# Patient Record
Sex: MALE | Race: WHITE | Employment: OTHER | URBAN - METROPOLITAN AREA
[De-identification: names, ages, dates, MRNs, and addresses within clinical notes are randomized per-mention and may not be internally consistent; named-entity substitution may affect disease eponyms.]

---

## 2017-01-01 ENCOUNTER — LAB REQUISITION (OUTPATIENT)
Dept: LAB | Facility: HOSPITAL | Age: 82
End: 2017-01-01
Payer: MEDICARE

## 2017-01-01 DIAGNOSIS — R41.0 DISORIENTATION: ICD-10-CM

## 2017-01-01 DIAGNOSIS — I48.91 ATRIAL FIBRILLATION (HCC): ICD-10-CM

## 2017-01-01 PROCEDURE — 87086 URINE CULTURE/COLONY COUNT: CPT | Performed by: INTERNAL MEDICINE

## 2017-01-01 PROCEDURE — 80048 BASIC METABOLIC PNL TOTAL CA: CPT | Performed by: INTERNAL MEDICINE

## 2017-01-01 PROCEDURE — 83880 ASSAY OF NATRIURETIC PEPTIDE: CPT | Performed by: INTERNAL MEDICINE

## 2017-01-01 PROCEDURE — 81001 URINALYSIS AUTO W/SCOPE: CPT | Performed by: INTERNAL MEDICINE

## 2017-01-01 PROCEDURE — 87186 SC STD MICRODIL/AGAR DIL: CPT | Performed by: INTERNAL MEDICINE

## 2017-01-01 PROCEDURE — 87077 CULTURE AEROBIC IDENTIFY: CPT | Performed by: INTERNAL MEDICINE

## 2017-02-01 PROCEDURE — 81003 URINALYSIS AUTO W/O SCOPE: CPT | Performed by: INTERNAL MEDICINE

## 2017-05-08 ENCOUNTER — LAB REQUISITION (OUTPATIENT)
Dept: LAB | Facility: HOSPITAL | Age: 82
End: 2017-05-08
Payer: MEDICARE

## 2017-05-08 DIAGNOSIS — E03.8 OTHER SPECIFIED HYPOTHYROIDISM: ICD-10-CM

## 2017-05-08 PROCEDURE — 83880 ASSAY OF NATRIURETIC PEPTIDE: CPT | Performed by: INTERNAL MEDICINE

## 2017-05-08 PROCEDURE — 85025 COMPLETE CBC W/AUTO DIFF WBC: CPT | Performed by: INTERNAL MEDICINE

## 2017-05-08 PROCEDURE — 80053 COMPREHEN METABOLIC PANEL: CPT | Performed by: INTERNAL MEDICINE

## 2017-05-08 PROCEDURE — 84443 ASSAY THYROID STIM HORMONE: CPT | Performed by: INTERNAL MEDICINE

## 2017-09-03 ENCOUNTER — LAB REQUISITION (OUTPATIENT)
Dept: LAB | Facility: HOSPITAL | Age: 82
End: 2017-09-03
Payer: MEDICARE

## 2017-09-03 DIAGNOSIS — R79.89 OTHER SPECIFIED ABNORMAL FINDINGS OF BLOOD CHEMISTRY: ICD-10-CM

## 2017-09-03 DIAGNOSIS — R68.89 OTHER GENERAL SYMPTOMS AND SIGNS: ICD-10-CM

## 2017-09-03 LAB
ANION GAP SERPL CALC-SCNC: 8 MMOL/L (ref 0–18)
BASOPHILS # BLD: 0.1 K/UL (ref 0–0.2)
BASOPHILS NFR BLD: 1 %
BUN SERPL-MCNC: 18 MG/DL (ref 8–20)
BUN/CREAT SERPL: 13 (ref 10–20)
CALCIUM SERPL-MCNC: 8.9 MG/DL (ref 8.5–10.5)
CHLORIDE SERPL-SCNC: 103 MMOL/L (ref 95–110)
CO2 SERPL-SCNC: 27 MMOL/L (ref 22–32)
CREAT SERPL-MCNC: 1.38 MG/DL (ref 0.5–1.5)
EOSINOPHIL # BLD: 0.4 K/UL (ref 0–0.7)
EOSINOPHIL NFR BLD: 5 %
ERYTHROCYTE [DISTWIDTH] IN BLOOD BY AUTOMATED COUNT: 14.9 % (ref 11–15)
GLUCOSE SERPL-MCNC: 99 MG/DL (ref 70–99)
HCT VFR BLD AUTO: 47.9 % (ref 41–52)
HGB BLD-MCNC: 15.7 G/DL (ref 13.5–17.5)
LYMPHOCYTES # BLD: 1.8 K/UL (ref 1–4)
LYMPHOCYTES NFR BLD: 21 %
MCH RBC QN AUTO: 29.9 PG (ref 27–32)
MCHC RBC AUTO-ENTMCNC: 32.7 G/DL (ref 32–37)
MCV RBC AUTO: 91.5 FL (ref 80–100)
MONOCYTES # BLD: 1.4 K/UL (ref 0–1)
MONOCYTES NFR BLD: 17 %
NEUTROPHILS # BLD AUTO: 4.8 K/UL (ref 1.8–7.7)
NEUTROPHILS NFR BLD: 56 %
OSMOLALITY UR CALC.SUM OF ELEC: 288 MOSM/KG (ref 275–295)
PLATELET # BLD AUTO: 185 K/UL (ref 140–400)
PMV BLD AUTO: 9.1 FL (ref 7.4–10.3)
POTASSIUM SERPL-SCNC: 3.6 MMOL/L (ref 3.3–5.1)
RBC # BLD AUTO: 5.24 M/UL (ref 4.5–5.9)
SODIUM SERPL-SCNC: 138 MMOL/L (ref 136–144)
WBC # BLD AUTO: 8.5 K/UL (ref 4–11)

## 2017-09-03 PROCEDURE — 80048 BASIC METABOLIC PNL TOTAL CA: CPT | Performed by: INTERNAL MEDICINE

## 2017-09-03 PROCEDURE — 85025 COMPLETE CBC W/AUTO DIFF WBC: CPT | Performed by: INTERNAL MEDICINE

## 2017-09-17 ENCOUNTER — LAB REQUISITION (OUTPATIENT)
Dept: LAB | Facility: HOSPITAL | Age: 82
End: 2017-09-17
Payer: MEDICARE

## 2017-09-17 DIAGNOSIS — R31.9 HEMATURIA: ICD-10-CM

## 2017-09-17 LAB
BILIRUB UR QL: NEGATIVE
CLARITY UR: CLEAR
COLOR UR: YELLOW
GLUCOSE UR-MCNC: NEGATIVE MG/DL
KETONES UR-MCNC: NEGATIVE MG/DL
NITRITE UR QL STRIP.AUTO: NEGATIVE
PH UR: 5 [PH] (ref 5–8)
PROT UR-MCNC: NEGATIVE MG/DL
RBC #/AREA URNS AUTO: 2 /HPF
SP GR UR STRIP: 1.01 (ref 1–1.03)
UROBILINOGEN UR STRIP-ACNC: 2
VIT C UR-MCNC: NEGATIVE MG/DL
WBC #/AREA URNS AUTO: 2 /HPF

## 2017-09-17 PROCEDURE — 87086 URINE CULTURE/COLONY COUNT: CPT | Performed by: INTERNAL MEDICINE

## 2017-09-17 PROCEDURE — 81001 URINALYSIS AUTO W/SCOPE: CPT | Performed by: INTERNAL MEDICINE

## 2017-09-19 ENCOUNTER — LAB REQUISITION (OUTPATIENT)
Dept: LAB | Facility: HOSPITAL | Age: 82
End: 2017-09-19
Payer: MEDICARE

## 2017-09-19 DIAGNOSIS — R41.0 DISORIENTATION: ICD-10-CM

## 2017-09-19 LAB
BACTERIA UR QL AUTO: NEGATIVE /HPF
BILIRUB UR QL: NEGATIVE
CLARITY UR: CLEAR
COLOR UR: YELLOW
GLUCOSE UR-MCNC: NEGATIVE MG/DL
KETONES UR-MCNC: NEGATIVE MG/DL
LEUKOCYTE ESTERASE UR QL STRIP.AUTO: NEGATIVE
NITRITE UR QL STRIP.AUTO: NEGATIVE
PH UR: 5 [PH] (ref 5–8)
PROT UR-MCNC: NEGATIVE MG/DL
RBC #/AREA URNS AUTO: 1 /HPF
SP GR UR STRIP: 1.01 (ref 1–1.03)
UROBILINOGEN UR STRIP-ACNC: <2
VIT C UR-MCNC: NEGATIVE MG/DL
WBC #/AREA URNS AUTO: 0 /HPF

## 2017-09-19 PROCEDURE — 81001 URINALYSIS AUTO W/SCOPE: CPT

## 2017-09-19 PROCEDURE — 87077 CULTURE AEROBIC IDENTIFY: CPT

## 2017-09-19 PROCEDURE — 87086 URINE CULTURE/COLONY COUNT: CPT

## 2017-09-19 PROCEDURE — 87186 SC STD MICRODIL/AGAR DIL: CPT

## 2018-01-01 ENCOUNTER — APPOINTMENT (OUTPATIENT)
Dept: CT IMAGING | Facility: HOSPITAL | Age: 83
End: 2018-01-01
Attending: EMERGENCY MEDICINE
Payer: MEDICARE

## 2018-01-01 ENCOUNTER — APPOINTMENT (OUTPATIENT)
Dept: CT IMAGING | Facility: HOSPITAL | Age: 83
DRG: 872 | End: 2018-01-01
Attending: NURSE PRACTITIONER
Payer: MEDICARE

## 2018-01-01 ENCOUNTER — APPOINTMENT (OUTPATIENT)
Dept: GENERAL RADIOLOGY | Facility: HOSPITAL | Age: 83
End: 2018-01-01
Attending: EMERGENCY MEDICINE
Payer: MEDICARE

## 2018-01-01 ENCOUNTER — APPOINTMENT (OUTPATIENT)
Dept: GENERAL RADIOLOGY | Facility: HOSPITAL | Age: 83
DRG: 872 | End: 2018-01-01
Attending: EMERGENCY MEDICINE
Payer: MEDICARE

## 2018-01-01 ENCOUNTER — APPOINTMENT (OUTPATIENT)
Dept: CT IMAGING | Facility: HOSPITAL | Age: 83
DRG: 872 | End: 2018-01-01
Attending: HOSPITALIST
Payer: MEDICARE

## 2018-01-01 ENCOUNTER — LAB REQUISITION (OUTPATIENT)
Dept: LAB | Facility: HOSPITAL | Age: 83
End: 2018-01-01
Payer: MEDICARE

## 2018-01-01 ENCOUNTER — HOSPITAL ENCOUNTER (EMERGENCY)
Facility: HOSPITAL | Age: 83
Discharge: HOME | End: 2018-01-01
Attending: EMERGENCY MEDICINE
Payer: MEDICARE

## 2018-01-01 ENCOUNTER — HOSPITAL ENCOUNTER (INPATIENT)
Facility: HOSPITAL | Age: 83
LOS: 3 days | Discharge: NURSING, REHAB OR LONG-TERM CARE | DRG: 872 | End: 2018-01-01
Attending: EMERGENCY MEDICINE | Admitting: HOSPITALIST
Payer: MEDICARE

## 2018-01-01 VITALS
TEMPERATURE: 98 F | WEIGHT: 185 LBS | RESPIRATION RATE: 18 BRPM | OXYGEN SATURATION: 93 % | DIASTOLIC BLOOD PRESSURE: 59 MMHG | HEART RATE: 77 BPM | HEIGHT: 71 IN | BODY MASS INDEX: 25.9 KG/M2 | SYSTOLIC BLOOD PRESSURE: 124 MMHG

## 2018-01-01 VITALS
SYSTOLIC BLOOD PRESSURE: 133 MMHG | HEIGHT: 72 IN | BODY MASS INDEX: 25.06 KG/M2 | WEIGHT: 185 LBS | HEART RATE: 82 BPM | RESPIRATION RATE: 18 BRPM | DIASTOLIC BLOOD PRESSURE: 72 MMHG | TEMPERATURE: 97 F | OXYGEN SATURATION: 92 %

## 2018-01-01 DIAGNOSIS — S61.412A LACERATION OF LEFT HAND WITHOUT FOREIGN BODY, INITIAL ENCOUNTER: ICD-10-CM

## 2018-01-01 DIAGNOSIS — E03.9 HYPOTHYROIDISM: ICD-10-CM

## 2018-01-01 DIAGNOSIS — N18.9 CHRONIC KIDNEY DISEASE: ICD-10-CM

## 2018-01-01 DIAGNOSIS — M19.91 PRIMARY OSTEOARTHRITIS: ICD-10-CM

## 2018-01-01 DIAGNOSIS — R79.89 OTHER SPECIFIED ABNORMAL FINDINGS OF BLOOD CHEMISTRY: ICD-10-CM

## 2018-01-01 DIAGNOSIS — N18.30 CHRONIC KIDNEY DISEASE, STAGE III (MODERATE) (HCC): ICD-10-CM

## 2018-01-01 DIAGNOSIS — R11.2 NAUSEA AND VOMITING IN ADULT: Primary | ICD-10-CM

## 2018-01-01 DIAGNOSIS — R60.9 EDEMA: ICD-10-CM
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
## 2018-01-01 DIAGNOSIS — S09.90XA CLOSED HEAD INJURY, INITIAL ENCOUNTER: Primary | ICD-10-CM

## 2018-01-01 DIAGNOSIS — R05.9 COUGH: ICD-10-CM

## 2018-01-01 DIAGNOSIS — Z91.81 HISTORY OF FALLING: ICD-10-CM

## 2018-01-01 DIAGNOSIS — J69.0 ASPIRATION PNEUMONITIS (HCC): ICD-10-CM

## 2018-01-01 DIAGNOSIS — N30.00 ACUTE CYSTITIS WITHOUT HEMATURIA: ICD-10-CM

## 2018-01-01 DIAGNOSIS — I10 ESSENTIAL (PRIMARY) HYPERTENSION: ICD-10-CM

## 2018-01-01 DIAGNOSIS — E78.5 HYPERLIPIDEMIA: ICD-10-CM

## 2018-01-01 DIAGNOSIS — R60.0 LOCALIZED EDEMA: ICD-10-CM

## 2018-01-01 DIAGNOSIS — R09.02 HYPOXIA: ICD-10-CM

## 2018-01-01 DIAGNOSIS — M62.81 MUSCLE WEAKNESS (GENERALIZED): ICD-10-CM

## 2018-01-01 DIAGNOSIS — T14.8XXA MULTIPLE SKIN TEARS: ICD-10-CM

## 2018-01-01 LAB
ADENOVIRUS PCR:: NEGATIVE
ALBUMIN SERPL BCP-MCNC: 3.5 G/DL (ref 3.5–4.8)
ALBUMIN/GLOB SERPL: 1.3 {RATIO} (ref 1–2)
ALP SERPL-CCNC: 62 U/L (ref 32–100)
ALT SERPL-CCNC: 13 U/L (ref 17–63)
ANION GAP SERPL CALC-SCNC: 7 MMOL/L (ref 0–18)
ANION GAP SERPL CALC-SCNC: 7 MMOL/L (ref 0–18)
AST SERPL-CCNC: 20 U/L (ref 15–41)
B PERT DNA SPEC QL NAA+PROBE: NEGATIVE
BACTERIA UR QL AUTO: NEGATIVE /HPF
BASOPHILS # BLD: 0.1 K/UL (ref 0–0.2)
BASOPHILS NFR BLD: 1 %
BILIRUB SERPL-MCNC: 1.2 MG/DL (ref 0.3–1.2)
BILIRUB UR QL: NEGATIVE
BUN SERPL-MCNC: 16 MG/DL (ref 8–20)
BUN SERPL-MCNC: 20 MG/DL (ref 8–20)
BUN/CREAT SERPL: 12.9 (ref 10–20)
BUN/CREAT SERPL: 14.9 (ref 10–20)
C PNEUM DNA SPEC QL NAA+PROBE: NEGATIVE
CALCIUM SERPL-MCNC: 8.8 MG/DL (ref 8.5–10.5)
CALCIUM SERPL-MCNC: 8.9 MG/DL (ref 8.5–10.5)
CHLORIDE SERPL-SCNC: 101 MMOL/L (ref 95–110)
CHLORIDE SERPL-SCNC: 104 MMOL/L (ref 95–110)
CLARITY UR: CLEAR
CO2 SERPL-SCNC: 30 MMOL/L (ref 22–32)
CO2 SERPL-SCNC: 32 MMOL/L (ref 22–32)
COLOR UR: YELLOW
CORONAVIRUS 229E PCR:: NEGATIVE
CORONAVIRUS HKU1 PCR:: NEGATIVE
CORONAVIRUS NL63 PCR:: NEGATIVE
CORONAVIRUS OC43 PCR:: NEGATIVE
CREAT SERPL-MCNC: 1.24 MG/DL (ref 0.5–1.5)
CREAT SERPL-MCNC: 1.34 MG/DL (ref 0.5–1.5)
EOSINOPHIL # BLD: 0.5 K/UL (ref 0–0.7)
EOSINOPHIL NFR BLD: 6 %
ERYTHROCYTE [DISTWIDTH] IN BLOOD BY AUTOMATED COUNT: 16.2 % (ref 11–15)
FLUAV RNA SPEC QL NAA+PROBE: NEGATIVE
FLUBV RNA SPEC QL NAA+PROBE: NEGATIVE
GLOBULIN PLAS-MCNC: 2.6 G/DL (ref 2.5–3.7)
GLUCOSE SERPL-MCNC: 85 MG/DL (ref 70–99)
GLUCOSE SERPL-MCNC: 93 MG/DL (ref 70–99)
GLUCOSE UR-MCNC: NEGATIVE MG/DL
HCT VFR BLD AUTO: 42.8 % (ref 41–52)
HGB BLD-MCNC: 14.4 G/DL (ref 13.5–17.5)
HGB UR QL STRIP.AUTO: NEGATIVE
HYALINE CASTS #/AREA URNS AUTO: 1 /LPF
KETONES UR-MCNC: NEGATIVE MG/DL
LEUKOCYTE ESTERASE UR QL STRIP.AUTO: NEGATIVE
LYMPHOCYTES # BLD: 1.4 K/UL (ref 1–4)
LYMPHOCYTES NFR BLD: 17 %
MCH RBC QN AUTO: 29.4 PG (ref 27–32)
MCHC RBC AUTO-ENTMCNC: 33.5 G/DL (ref 32–37)
MCV RBC AUTO: 87.8 FL (ref 80–100)
METAPNEUMOVIRUS PCR:: NEGATIVE
MONOCYTES # BLD: 1.2 K/UL (ref 0–1)
MONOCYTES NFR BLD: 15 %
MYCOPLASMA PNEUMONIA PCR:: NEGATIVE
NEUTROPHILS # BLD AUTO: 5.1 K/UL (ref 1.8–7.7)
NEUTROPHILS NFR BLD: 61 %
NITRITE UR QL STRIP.AUTO: NEGATIVE
OSMOLALITY UR CALC.SUM OF ELEC: 286 MOSM/KG (ref 275–295)
OSMOLALITY UR CALC.SUM OF ELEC: 298 MOSM/KG (ref 275–295)
PARAINFLUENZA 1 PCR:: NEGATIVE
PARAINFLUENZA 2 PCR:: NEGATIVE
PARAINFLUENZA 3 PCR:: NEGATIVE
PARAINFLUENZA 4 PCR:: NEGATIVE
PH UR: 5 [PH] (ref 5–8)
PLATELET # BLD AUTO: 146 K/UL (ref 140–400)
PMV BLD AUTO: 9.7 FL (ref 7.4–10.3)
POTASSIUM SERPL-SCNC: 3.1 MMOL/L (ref 3.3–5.1)
POTASSIUM SERPL-SCNC: 3.5 MMOL/L (ref 3.3–5.1)
PROT SERPL-MCNC: 6.1 G/DL (ref 5.9–8.4)
PROT UR-MCNC: NEGATIVE MG/DL
RBC # BLD AUTO: 4.88 M/UL (ref 4.5–5.9)
RBC #/AREA URNS AUTO: 1 /HPF
RHINOVIRUS/ENTERO PCR:: NEGATIVE
RSV RNA SPEC QL NAA+PROBE: NEGATIVE
SODIUM SERPL-SCNC: 138 MMOL/L (ref 136–144)
SODIUM SERPL-SCNC: 143 MMOL/L (ref 136–144)
SP GR UR STRIP: 1.01 (ref 1–1.03)
TSH SERPL-ACNC: 4.26 UIU/ML (ref 0.45–5.33)
UROBILINOGEN UR STRIP-ACNC: <2
VIT C UR-MCNC: 40 MG/DL
WBC # BLD AUTO: 8.3 K/UL (ref 4–11)
WBC #/AREA URNS AUTO: 1 /HPF

## 2018-01-01 PROCEDURE — 85025 COMPLETE CBC W/AUTO DIFF WBC: CPT | Performed by: EMERGENCY MEDICINE

## 2018-01-01 PROCEDURE — 85025 COMPLETE CBC W/AUTO DIFF WBC: CPT | Performed by: INTERNAL MEDICINE

## 2018-01-01 PROCEDURE — 36415 COLL VENOUS BLD VENIPUNCTURE: CPT

## 2018-01-01 PROCEDURE — A4216 STERILE WATER/SALINE, 10 ML: HCPCS | Performed by: HOSPITALIST

## 2018-01-01 PROCEDURE — 93010 ELECTROCARDIOGRAM REPORT: CPT | Performed by: EMERGENCY MEDICINE

## 2018-01-01 PROCEDURE — 96365 THER/PROPH/DIAG IV INF INIT: CPT

## 2018-01-01 PROCEDURE — 84132 ASSAY OF SERUM POTASSIUM: CPT | Performed by: HOSPITALIST

## 2018-01-01 PROCEDURE — 71250 CT THORAX DX C-: CPT | Performed by: NURSE PRACTITIONER

## 2018-01-01 PROCEDURE — 87086 URINE CULTURE/COLONY COUNT: CPT

## 2018-01-01 PROCEDURE — 83880 ASSAY OF NATRIURETIC PEPTIDE: CPT | Performed by: EMERGENCY MEDICINE

## 2018-01-01 PROCEDURE — 93005 ELECTROCARDIOGRAM TRACING: CPT

## 2018-01-01 PROCEDURE — 87077 CULTURE AEROBIC IDENTIFY: CPT | Performed by: EMERGENCY MEDICINE

## 2018-01-01 PROCEDURE — 73560 X-RAY EXAM OF KNEE 1 OR 2: CPT | Performed by: EMERGENCY MEDICINE

## 2018-01-01 PROCEDURE — 82040 ASSAY OF SERUM ALBUMIN: CPT | Performed by: FAMILY MEDICINE

## 2018-01-01 PROCEDURE — 97161 PT EVAL LOW COMPLEX 20 MIN: CPT

## 2018-01-01 PROCEDURE — 85025 COMPLETE CBC W/AUTO DIFF WBC: CPT | Performed by: HOSPITALIST

## 2018-01-01 PROCEDURE — 87486 CHLMYD PNEUM DNA AMP PROBE: CPT

## 2018-01-01 PROCEDURE — 83690 ASSAY OF LIPASE: CPT | Performed by: EMERGENCY MEDICINE

## 2018-01-01 PROCEDURE — 99285 EMERGENCY DEPT VISIT HI MDM: CPT

## 2018-01-01 PROCEDURE — 73130 X-RAY EXAM OF HAND: CPT | Performed by: EMERGENCY MEDICINE

## 2018-01-01 PROCEDURE — 96376 TX/PRO/DX INJ SAME DRUG ADON: CPT

## 2018-01-01 PROCEDURE — 81003 URINALYSIS AUTO W/O SCOPE: CPT

## 2018-01-01 PROCEDURE — 83735 ASSAY OF MAGNESIUM: CPT | Performed by: HOSPITALIST

## 2018-01-01 PROCEDURE — 92610 EVALUATE SWALLOWING FUNCTION: CPT

## 2018-01-01 PROCEDURE — 81001 URINALYSIS AUTO W/SCOPE: CPT | Performed by: EMERGENCY MEDICINE

## 2018-01-01 PROCEDURE — 96375 TX/PRO/DX INJ NEW DRUG ADDON: CPT

## 2018-01-01 PROCEDURE — 80076 HEPATIC FUNCTION PANEL: CPT | Performed by: EMERGENCY MEDICINE

## 2018-01-01 PROCEDURE — 80076 HEPATIC FUNCTION PANEL: CPT | Performed by: NURSE PRACTITIONER

## 2018-01-01 PROCEDURE — 87186 SC STD MICRODIL/AGAR DIL: CPT | Performed by: EMERGENCY MEDICINE

## 2018-01-01 PROCEDURE — 83605 ASSAY OF LACTIC ACID: CPT | Performed by: NURSE PRACTITIONER

## 2018-01-01 PROCEDURE — 80048 BASIC METABOLIC PNL TOTAL CA: CPT | Performed by: NURSE PRACTITIONER

## 2018-01-01 PROCEDURE — 80048 BASIC METABOLIC PNL TOTAL CA: CPT | Performed by: INTERNAL MEDICINE

## 2018-01-01 PROCEDURE — 12001 RPR S/N/AX/GEN/TRNK 2.5CM/<: CPT

## 2018-01-01 PROCEDURE — 80048 BASIC METABOLIC PNL TOTAL CA: CPT | Performed by: EMERGENCY MEDICINE

## 2018-01-01 PROCEDURE — 80053 COMPREHEN METABOLIC PANEL: CPT | Performed by: INTERNAL MEDICINE

## 2018-01-01 PROCEDURE — C9113 INJ PANTOPRAZOLE SODIUM, VIA: HCPCS | Performed by: EMERGENCY MEDICINE

## 2018-01-01 PROCEDURE — 87040 BLOOD CULTURE FOR BACTERIA: CPT | Performed by: EMERGENCY MEDICINE

## 2018-01-01 PROCEDURE — 87633 RESP VIRUS 12-25 TARGETS: CPT

## 2018-01-01 PROCEDURE — 74176 CT ABD & PELVIS W/O CONTRAST: CPT | Performed by: NURSE PRACTITIONER

## 2018-01-01 PROCEDURE — 87581 M.PNEUMON DNA AMP PROBE: CPT

## 2018-01-01 PROCEDURE — 85025 COMPLETE CBC W/AUTO DIFF WBC: CPT | Performed by: NURSE PRACTITIONER

## 2018-01-01 PROCEDURE — 87086 URINE CULTURE/COLONY COUNT: CPT | Performed by: EMERGENCY MEDICINE

## 2018-01-01 PROCEDURE — 71045 X-RAY EXAM CHEST 1 VIEW: CPT | Performed by: EMERGENCY MEDICINE

## 2018-01-01 PROCEDURE — 87798 DETECT AGENT NOS DNA AMP: CPT

## 2018-01-01 PROCEDURE — 70450 CT HEAD/BRAIN W/O DYE: CPT | Performed by: EMERGENCY MEDICINE

## 2018-01-01 PROCEDURE — 84443 ASSAY THYROID STIM HORMONE: CPT | Performed by: INTERNAL MEDICINE

## 2018-01-01 PROCEDURE — 84484 ASSAY OF TROPONIN QUANT: CPT | Performed by: EMERGENCY MEDICINE

## 2018-01-01 PROCEDURE — 97535 SELF CARE MNGMENT TRAINING: CPT

## 2018-01-01 PROCEDURE — A4216 STERILE WATER/SALINE, 10 ML: HCPCS | Performed by: EMERGENCY MEDICINE

## 2018-01-01 PROCEDURE — 80053 COMPREHEN METABOLIC PANEL: CPT | Performed by: HOSPITALIST

## 2018-01-01 PROCEDURE — 97530 THERAPEUTIC ACTIVITIES: CPT

## 2018-01-01 PROCEDURE — 72125 CT NECK SPINE W/O DYE: CPT | Performed by: EMERGENCY MEDICINE

## 2018-01-01 PROCEDURE — 84145 PROCALCITONIN (PCT): CPT | Performed by: NURSE PRACTITIONER

## 2018-01-01 PROCEDURE — 97166 OT EVAL MOD COMPLEX 45 MIN: CPT

## 2018-01-01 RX ORDER — POLYETHYLENE GLYCOL 3350 17 G/17G
17 POWDER, FOR SOLUTION ORAL DAILY PRN
Status: DISCONTINUED | OUTPATIENT
Start: 2018-01-01 | End: 2018-01-01

## 2018-01-01 RX ORDER — SODIUM CHLORIDE 9 MG/ML
INJECTION, SOLUTION INTRAVENOUS CONTINUOUS
Status: DISCONTINUED | OUTPATIENT
Start: 2018-01-01 | End: 2018-01-01

## 2018-01-01 RX ORDER — IBUPROFEN 200 MG
CAPSULE ORAL DAILY
COMMUNITY

## 2018-01-01 RX ORDER — ONDANSETRON 4 MG/1
4 TABLET, ORALLY DISINTEGRATING ORAL EVERY 8 HOURS PRN
Qty: 20 TABLET | Refills: 0 | Status: SHIPPED | OUTPATIENT
Start: 2018-01-01

## 2018-01-01 RX ORDER — CEPHALEXIN 500 MG/1
500 CAPSULE ORAL ONCE
Status: COMPLETED | OUTPATIENT
Start: 2018-01-01 | End: 2018-01-01

## 2018-01-01 RX ORDER — POTASSIUM CHLORIDE 20 MEQ/1
40 TABLET, EXTENDED RELEASE ORAL ONCE
Status: COMPLETED | OUTPATIENT
Start: 2018-01-01 | End: 2018-01-01

## 2018-01-01 RX ORDER — ASPIRIN 325 MG
325 TABLET ORAL DAILY
Status: DISCONTINUED | OUTPATIENT
Start: 2018-01-01 | End: 2018-01-01

## 2018-01-01 RX ORDER — SERTRALINE HYDROCHLORIDE 100 MG/1
100 TABLET, FILM COATED ORAL
Status: DISCONTINUED | OUTPATIENT
Start: 2018-01-01 | End: 2018-01-01

## 2018-01-01 RX ORDER — HEPARIN SODIUM 5000 [USP'U]/ML
5000 INJECTION, SOLUTION INTRAVENOUS; SUBCUTANEOUS EVERY 8 HOURS SCHEDULED
Status: DISCONTINUED | OUTPATIENT
Start: 2018-01-01 | End: 2018-01-01

## 2018-01-01 RX ORDER — ATORVASTATIN CALCIUM 20 MG/1
20 TABLET, FILM COATED ORAL
Status: DISCONTINUED | OUTPATIENT
Start: 2018-01-01 | End: 2018-01-01

## 2018-01-01 RX ORDER — SACCHAROMYCES BOULARDII 250 MG
250 CAPSULE ORAL 2 TIMES DAILY
Status: DISCONTINUED | OUTPATIENT
Start: 2018-01-01 | End: 2018-01-01

## 2018-01-01 RX ORDER — MINERAL OIL, PETROLATUM 425; 568 MG/G; MG/G
OINTMENT OPHTHALMIC DAILY
COMMUNITY

## 2018-01-01 RX ORDER — BISACODYL 10 MG
10 SUPPOSITORY, RECTAL RECTAL
Status: DISCONTINUED | OUTPATIENT
Start: 2018-01-01 | End: 2018-01-01

## 2018-01-01 RX ORDER — CEFDINIR 300 MG/1
300 CAPSULE ORAL 2 TIMES DAILY
Qty: 14 CAPSULE | Refills: 0 | Status: SHIPPED | OUTPATIENT
Start: 2018-01-01 | End: 2018-01-01

## 2018-01-01 RX ORDER — MAGNESIUM OXIDE 400 MG (241.3 MG MAGNESIUM) TABLET
2 TABLET NIGHTLY PRN
Status: DISCONTINUED | OUTPATIENT
Start: 2018-01-01 | End: 2018-01-01

## 2018-01-01 RX ORDER — SODIUM CHLORIDE 0.9 % (FLUSH) 0.9 %
3 SYRINGE (ML) INJECTION AS NEEDED
Status: DISCONTINUED | OUTPATIENT
Start: 2018-01-01 | End: 2018-01-01

## 2018-01-01 RX ORDER — LEVOTHYROXINE SODIUM 0.03 MG/1
25 TABLET ORAL
Status: DISCONTINUED | OUTPATIENT
Start: 2018-01-01 | End: 2018-01-01

## 2018-01-01 RX ORDER — METOCLOPRAMIDE HYDROCHLORIDE 5 MG/ML
10 INJECTION INTRAMUSCULAR; INTRAVENOUS EVERY 8 HOURS PRN
Status: DISCONTINUED | OUTPATIENT
Start: 2018-01-01 | End: 2018-01-01

## 2018-01-01 RX ORDER — POTASSIUM CHLORIDE 20 MEQ/1
40 TABLET, EXTENDED RELEASE ORAL EVERY 4 HOURS
Status: DISCONTINUED | OUTPATIENT
Start: 2018-01-01 | End: 2018-01-01

## 2018-01-01 RX ORDER — ONDANSETRON 2 MG/ML
4 INJECTION INTRAMUSCULAR; INTRAVENOUS EVERY 6 HOURS PRN
Status: DISCONTINUED | OUTPATIENT
Start: 2018-01-01 | End: 2018-01-01

## 2018-01-01 RX ORDER — LORAZEPAM 1 MG/1
TABLET ORAL
Qty: 10 TABLET | Refills: 0 | Status: SHIPPED | OUTPATIENT
Start: 2018-01-01

## 2018-01-01 RX ORDER — POLYVINYL ALCOHOL 14 MG/ML
1 SOLUTION/ DROPS OPHTHALMIC DAILY
Status: DISCONTINUED | OUTPATIENT
Start: 2018-01-01 | End: 2018-01-01

## 2018-01-01 RX ORDER — TRAMADOL HYDROCHLORIDE 50 MG/1
50 TABLET ORAL DAILY
Status: ON HOLD | COMMUNITY
End: 2018-01-01

## 2018-01-01 RX ORDER — SACCHAROMYCES BOULARDII 250 MG
250 CAPSULE ORAL 2 TIMES DAILY
Qty: 20 CAPSULE | Refills: 0 | Status: SHIPPED | OUTPATIENT
Start: 2018-01-01 | End: 2018-01-01

## 2018-01-01 RX ORDER — DOCUSATE SODIUM 100 MG/1
100 CAPSULE, LIQUID FILLED ORAL 2 TIMES DAILY
Status: DISCONTINUED | OUTPATIENT
Start: 2018-01-01 | End: 2018-01-01

## 2018-01-01 RX ORDER — DEXTROSE AND SODIUM CHLORIDE 5; .45 G/100ML; G/100ML
INJECTION, SOLUTION INTRAVENOUS CONTINUOUS
Status: DISCONTINUED | OUTPATIENT
Start: 2018-01-01 | End: 2018-01-01

## 2018-01-01 RX ORDER — ONDANSETRON 2 MG/ML
4 INJECTION INTRAMUSCULAR; INTRAVENOUS EVERY 4 HOURS PRN
Status: DISCONTINUED | OUTPATIENT
Start: 2018-01-01 | End: 2018-01-01

## 2018-01-01 RX ORDER — ONDANSETRON 2 MG/ML
4 INJECTION INTRAMUSCULAR; INTRAVENOUS ONCE
Status: COMPLETED | OUTPATIENT
Start: 2018-01-01 | End: 2018-01-01

## 2018-01-01 RX ORDER — CEPHALEXIN 500 MG/1
500 CAPSULE ORAL 3 TIMES DAILY
Qty: 21 CAPSULE | Refills: 0 | Status: SHIPPED | OUTPATIENT
Start: 2018-01-01 | End: 2018-01-01

## 2018-01-01 RX ORDER — SODIUM PHOSPHATE, DIBASIC AND SODIUM PHOSPHATE, MONOBASIC 7; 19 G/133ML; G/133ML
1 ENEMA RECTAL ONCE AS NEEDED
Status: DISCONTINUED | OUTPATIENT
Start: 2018-01-01 | End: 2018-01-01

## 2018-01-01 RX ORDER — CEFUROXIME AXETIL 500 MG/1
500 TABLET ORAL 2 TIMES DAILY
Qty: 8 TABLET | Refills: 0 | Status: SHIPPED | OUTPATIENT
Start: 2018-01-01 | End: 2018-01-01

## 2018-01-01 RX ORDER — ACETAMINOPHEN 325 MG/1
650 TABLET ORAL EVERY 6 HOURS PRN
Status: DISCONTINUED | OUTPATIENT
Start: 2018-01-01 | End: 2018-01-01

## 2018-01-01 RX ORDER — POLYETHYLENE GLYCOL 3350 17 G/17G
17 POWDER, FOR SOLUTION ORAL DAILY
Status: DISCONTINUED | OUTPATIENT
Start: 2018-01-01 | End: 2018-01-01

## 2018-05-31 PROBLEM — R11.2 NAUSEA AND VOMITING IN ADULT: Status: ACTIVE | Noted: 2018-01-01

## 2018-06-01 PROBLEM — J69.0 ASPIRATION PNEUMONITIS (HCC): Status: ACTIVE | Noted: 2018-01-01

## 2018-06-01 PROBLEM — R09.02 HYPOXIA: Status: ACTIVE | Noted: 2018-01-01

## 2018-06-01 NOTE — CM/SW NOTE
06/01/18 1400   CM/SW Screening   Referral Source Nurse;   Information Source Chart review;[facility] staff;Nursing rounds   Patient's Mental Status Confused   [facility]   Patient lives with Alone   Pa

## 2018-06-01 NOTE — ED NOTES
Spoke with 5th floor charge who states she does not have a room for the patient.  Demetrius Quintero made aware

## 2018-06-01 NOTE — CM/SW NOTE
Rosa confirmed pt is from [facility] assisted living memory unit. Dtr/Halle stated she is hopefully pt will be able to return back, but is agreeable for rehab at [facility], if recommended. ROSA contacted [facility] for DON screen.     Urmila Share, [address]

## 2018-06-01 NOTE — ED INITIAL ASSESSMENT (HPI)
Pt arrived per EMS from [facility]. EMS states per facility pt started vomiting after dinner and has not been feeling well for a couple of days. Pt is on  Alzheimer unit and is a/o x 1 at baseline.

## 2018-06-01 NOTE — H&P
[facility] Hospitalist Team  History and Physical     ASSESSMENT / PLAN:   79 yo male who resides alzheimers dementia, chronic afib- no AC due to falls, CKD stage 3, HTN, OAB, anxiety/depression, hypothyroidism,HL, bladder CA, skin cancer who presents with N/V.  P Abdominal pain, N/V, diarrhea, dysuria, fever, pain. Hx obtained via daughter Phillip Chaudhry with pt dementia.  Per Phillip Chaudhry pt had dinner around 4 pm yesterday and some time after that started to profusely vomit and haves shaking which prompted ER eval. While in t arthritis 7/8/2013   • HYPERLIPIDEMIA    • HYPERTENSION    • IMPOTENCE    • KIDNEY STONE    • Memory loss 2/15/2013   • OSTEOARTHRITIS     Knees   • OTHER DISEASES     Coronary vasospasm   • OTHER DISEASES     History of hepatitis B- No residual disease 20 MG Oral Tab Take 1 tablet (20 mg total) by mouth once daily. Disp: 90 tablet Rfl: 3   Polyethylene Glycol 3350 (MIRALAX) Oral Powder Take 17 g by mouth daily.  Disp: 850 g Rfl: 6   acetaminophen (TYLENOL EXTRA STRENGTH) 500 MG Oral Tab Take 2 tablets by headaches or vision changes, denies abd pain, no other complaints    objective  /55 (BP Location: Left arm)   Pulse 69   Temp 98.9 °F (37.2 °C) (Oral)   Resp 18   Ht 5' 11\" (1.803 m)   Wt 185 lb (83.9 kg)   SpO2 96%   BMI 25.80 kg/m²      Gen: No ac

## 2018-06-01 NOTE — OCCUPATIONAL THERAPY NOTE
OCCUPATIONAL THERAPY EVALUATION - INPATIENT     Room Number: 448/731-F  Evaluation Date: 6/1/2018  Type of Evaluation: Initial  Presenting Problem: nausea/vomiting    Physician Order: IP Consult to Occupational Therapy  Reason for Therapy: ADL/IADL Dysfunc status. At this time, not sure what patient's baseline functioning is (BADLs, transfers, mobility); will follow up w/ patient's daughter Phillip Chaudhry for more information on PLOF to better determine patient's acute IP goals and plan for discharge. RN aware.   In t disease   • OTHER DISEASES     Right inguinal hernia   • OTHER DISEASES     Essential tremor   • OTHER DISEASES     Cartilage injury to right knee- Pain manageable   • Venous insufficiency 9/25/2013       Past Surgical History  Past Surgical History:  No d and visual cues for therapy tasks    RANGE OF MOTION   Upper extremity ROM is within functional limits     STRENGTH ASSESSMENT  Upper extremity strength is within functional limits     COORDINATION  Gross Motor: WFL   Fine Motor: WFL     ADDITIONAL TESTS complete functional transfer w/ mod A. (goal pending depending on increased information of patient's PLOF).    Comment:     Comment:          Goals  on: 2018  Frequency: 3x/week    ALBINO Cannon/L  [facility],THE

## 2018-06-01 NOTE — PLAN OF CARE
CARDIOVASCULAR - ADULT    • Maintains optimal cardiac output and hemodynamic stability Progressing        GASTROINTESTINAL - ADULT    • Minimal or absence of nausea and vomiting Progressing        Impaired Activities of Daily Living    • Achieve highest/sa

## 2018-06-01 NOTE — ED PROVIDER NOTES
Patient Seen in: [facility] AND [facility] Emergency Department    History   Patient presents with:  Nausea/Vomiting/Diarrhea (gastrointestinal)    Stated Complaint:  Nausea/vomiting    HPI    81 yo M with PMH Alzheimer dementia (baseline orientation/neuro status Glycol (SYSTANE BALANCE) 0.6 % Ophthalmic Solution,  Apply to eye. Diclofenac Sodium (VOLTAREN) 1 % Transdermal Gel,  Apply 2 g to each knee three times daily as needed for pain   furosemide 20 MG Oral Tab,  Take 1 tablet (20 mg total) by mouth daily. in HPI.     Physical Exam   ED Triage Vitals [05/31/18 2016]  BP: 145/75  Pulse: 88  Resp: 24  Temp: 99.8 °F (37.7 °C)  Temp src: Oral  SpO2: 95 %  O2 Device: None (Room air)    Current:/75   Pulse 88   Temp 99.8 °F (37.7 °C) (Oral)   Resp 24   Ht 180 Status                     ---------                               -----------         ------                     CBC W/ DIFFERENTIAL[[id number]]          Abnormal            Final result                 Please view results for these tests on the individual without respiratory distress though labs notable for leukocytosis without bandemia in AF/HDS patient. Given aforementioned, will admit for ongoing management - NH paperwork reviewed and with DNR/comfort care measure noted.  PCP Dr. Winnie Donato, graciously admitte

## 2018-06-02 NOTE — PROGRESS NOTES
[facility] Hospitalist Progress Note     CC: Hospital Follow up    PCP: Juancho Lombardi MD       Assessment/Plan:     Principal Problem:    Nausea and vomiting in adult  Active Problems:    Aspiration pneumonitis [facility])    Hypoxia    81 yo male who resides Hospitalist     Subjective:     Feeling well, denies complaints    OBJECTIVE:    Blood pressure 131/68, pulse 85, temperature 99.4 °F (37.4 °C), temperature source Oral, resp. rate 18, height 5' 11\" (1.803 m), weight 185 lb (83.9 kg), SpO2 93 %.     Temp: Date: 6/1/2018  CONCLUSION:  1. No evidence for pneumonia. Subsegmental atelectasis in both lower lobes. 2. Nonspecific bilateral perinephric stranding. Correlation with urinalysis suggested to exclude a urinary tract infection.  Aside from this, no abdomin

## 2018-06-02 NOTE — PLAN OF CARE
Problem: Patient Centered Care  Goal: Patient preferences are identified and integrated in the patient's plan of care  Interventions:  - What would you like us to know as we care for you?  [facility]  - Provide timely, complete, and accurate infor tolerated  - Evaluate effectiveness of ordered antiemetic medications  - Provide nonpharmacologic comfort measures as appropriate  - Advance diet as tolerated, if ordered  - Obtain nutritional consult as needed  - Evaluate fluid balance    Outcome: Tej patient/family in tolerated functional activity level and precautions during self-care  - Encourage patient to incorporate impaired side during daily activities to promote function   Outcome: Progressing  OT rec SNF;  Awaiting PT    Problem: Impaired Swallo

## 2018-06-02 NOTE — SLP NOTE
ADULT SWALLOWING EVALUATION    ASSESSMENT    ASSESSMENT/OVERALL IMPRESSION:  Pt seen sitting upright in bed for all PO trials for BSSE. Pt with good oral acceptance and bilabial seal across all trials. No anterior loss of bolus on any consistency given.  Gogo Guthrie resuscitate) 10/12/2016   • Edema 9/25/2013   • Hip arthritis 7/8/2013   • HYPERLIPIDEMIA    • HYPERTENSION    • IMPOTENCE    • KIDNEY STONE    • Memory loss 2/15/2013   • OSTEOARTHRITIS     Knees   • OTHER DISEASES     Coronary vasospasm   • OTHER DISEASE accuracy over 1 session(s). In Progress   Goal #2 The patient/family/caregiver will demonstrate understanding and implementation of aspiration precautions and swallow strategies independently over 1 session(s).     In Progress   Goal #3 The patient will ut

## 2018-06-03 NOTE — PROGRESS NOTES
[facility] Hospitalist Progress Note     CC: Hospital Follow up    PCP: Janine Alvarado MD       Assessment/Plan:     Principal Problem:    Nausea and vomiting in adult  Active Problems:    Aspiration pneumonitis [facility])    Hypoxia    79 yo male who resides discussed with michelle (daughter) son, and SW  PCP: Garth Phoenix MD    Questions/concerns were discussed with patient and/or family by bedside.     Thank Nixon Hdez MD    [facility] Hospitalist     Subjective:     Feeling well, denies complaints    OB 0519   ALT  11*  15*  21   AST  24  22  28   ALB  3.9  3.5  3.3*         Imaging:  Ct Chest+abdomen+pelvis(cpt=71250/03785)    Result Date: 6/1/2018  CONCLUSION:  1. No evidence for pneumonia. Subsegmental atelectasis in both lower lobes.  2. Nonspecific bi

## 2018-06-03 NOTE — PHYSICAL THERAPY NOTE
PHYSICAL THERAPY EVALUATION - INPATIENT     Room Number: 332/672-F  Evaluation Date: 6/3/2018  Type of Evaluation: Initial   Physician Order: PT Eval and Treat    Presenting Problem: nausea, vomitting  Reason for Therapy: Mobility Dysfunction and Discharg in preparation for discharge. DISCHARGE RECOMMENDATIONS  PT Discharge Recommendations: Cont skilled therapy in a supervised setting    PLAN  PT Treatment Plan: Bed mobility; Patient education; Family education;Gait training;Stair training;Transfer trainin Caregiver 24 hours  Drives: No  Patient Owned Equipment: Other (Comment) (uses w/c)       Prior Level of [place]: assist for ADLs, able to t/f to w/c for the day    SUBJECTIVE  \"Ok! \"    PHYSICAL THERAPY EXAMINATION     OBJECTIVE  Precautions: Bed/ch RW    Exercise/Education Provided:  Bed mobility  Transfer training  PT eval    Patient End of Session: Up in chair;Needs met;Call light within reach;RN aware of session/findings; All patient questions and concerns addressed; Alarm set; Family present    CURR

## 2018-06-03 NOTE — PLAN OF CARE
Problem: Patient Centered Care  Goal: Patient preferences are identified and integrated in the patient's plan of care  Interventions:  - What would you like us to know as we care for you?  [facility]  - Provide timely, complete, and accurate infor intact  INTERVENTIONS  - Assess and document risk factors for pressure ulcer development  - Assess and document skin integrity  - Monitor for areas of redness and/or skin breakdown  - Initiate interventions, skin care algorithm/standards of care as needed

## 2018-06-03 NOTE — OCCUPATIONAL THERAPY NOTE
OCCUPATIONAL THERAPY TREATMENT NOTE - INPATIENT        Room Number: 176/182-A    Presenting Problem: nausea/vomiting    Problem List  Principal Problem:    Nausea and vomiting in adult  Active Problems:    Aspiration pneumonitis (HCC)    Hypoxia      ASSES personal grooming such as brushing teeth?: A Lot  -   Eating meals?: A Lot    AM-PAC Score:  Score: 12  Approx Degree of Impairment: 66.57%  Standardized Score (AM-PAC Scale): 30.6  CMS Modifier (G-Code): CL    FUNCTIONAL TRANSFER ASSESSMENT  Supine to Sit

## 2018-06-03 NOTE — PLAN OF CARE
Problem: Patient Centered Care  Goal: Patient preferences are identified and integrated in the patient's plan of care  Interventions:  - What would you like us to know as we care for you?  [facility]  - Provide timely, complete, and accurate infor rhythm and repeat lab results as appropriate   Outcome: Progressing  VSS, Labs WNL    Problem: SKIN/TISSUE INTEGRITY - ADULT  Goal: Skin integrity remains intact  INTERVENTIONS  - Assess and document risk factors for pressure ulcer development  - Assess an vocal quality is noted   Outcome: Progressing  Tolerating diet well;  No straw

## 2018-06-04 PROBLEM — R09.02 HYPOXIA: Status: RESOLVED | Noted: 2018-01-01 | Resolved: 2018-01-01

## 2018-06-04 PROBLEM — J69.0 ASPIRATION PNEUMONITIS (HCC): Status: RESOLVED | Noted: 2018-01-01 | Resolved: 2018-01-01

## 2018-06-04 PROBLEM — R11.2 NAUSEA AND VOMITING IN ADULT: Status: RESOLVED | Noted: 2018-01-01 | Resolved: 2018-01-01

## 2018-06-04 NOTE — DISCHARGE SUMMARY
[facility] Hospitalist Discharge Summary   Patient ID:  Arianna Juarez  [id number]  27 year old  11/13/1926    Admit date: 5/31/2018  Discharge date: 6/4/2018    Primary Care Physician: Ron Vazquez MD   Attending Physician: Adolph North MD   Consult cancer who presents with N/V. Pt found to have leukocytosis and hypoxemia on arrival to ER, concerns for possible Aspiration PNA however CT chest was negative for PNA.  CT did suggest possible perinephric stranding, UA was done after abx started and urine c appendectomy. 6. Generalized atherosclerotic vascular calcification including coronary artery calcification. 7. Mild dilatation of aortic root ( 4.4 cm) and ascending aorta (4.3 cm). 8. Aortic valve and mitral annulus calcification. 9. Small hiatal hernia. known as:  ZOLOFT  Take 1 tablet (100 mg total) by mouth once daily.      SYSTANE BALANCE 0.6 % Soln  Generic drug:  Propylene Glycol     triple antibiotic 3.5-400-5000 Oint     TYLENOL EXTRA STRENGTH 500 MG Tabs  Generic drug:  acetaminophen     Vitamin D Clear  Abd: soft, NT/ND +BS  Ext: no clubbing/cyanosis    Total time spend on coordinating care: 40 minutes    Alexia Duvall MD

## 2018-06-04 NOTE — CM/SW NOTE
RN informed SW that pt is cleared to discharge today and will need ambulance transport (2 max assist, confused). Pt is from [address] [place] [address]. SW made referral to [facility].  ROSA spoke w/ Jayy Luna who stated they will be ready for pt at 1pm. SW spoke w/ Lat

## 2018-11-02 NOTE — ED NOTES
Care endorsed to ems. Pts discharge paperwork provided to ems to give to nursing staff at memory [facility].

## 2018-11-02 NOTE — ED NOTES
Patient arrives by EMS s/p being found on floor by palliative care staff. Upon arrival, patient is alert, orientated x 1 per EMS is patient's norm.  Patient's daughter arrives and verifies mental status, stating that patient is currently in palliative care,

## 2018-11-02 NOTE — ED NOTES
Pt and pts family members provided with discharge instructions. Verbalized understanding for plan of care at home and follow up. All questions/concerns addressed prior to discharge. Iv removed by er tech.

## 2018-11-02 NOTE — ED INITIAL ASSESSMENT (HPI)
Unwitnessed fall at [facility]. Multiple skin tears/avulsions. +hematoma to right side of head. Presently in [place]. Unknown LOC.

## 2018-11-02 NOTE — ED PROVIDER NOTES
Patient Seen in: [facility] AND [facility] Emergency Department    History   Patient presents with:  Fall (musculoskeletal, neurologic)  Trauma (cardiovascular, musculoskeletal)    Stated Complaint:     HPI    The patient is a 54-year-old male with a history of Past Surgical History:   Procedure Laterality Date   • APPENDECTOMY     • COLONOSCOPY  10-03    Negative   • OTHER SURGICAL HISTORY      Left shoulder repair   • OTHER SURGICAL HISTORY  2007    Laser TURP with Dr. Lauri Pryor   • TONSILLECTOMY             So Abdominal: Soft. Normal appearance and bowel sounds are normal. He exhibits no distension. There is no tenderness. There is no rigidity, no guarding and no CVA tenderness. No echymosis   Musculoskeletal: Normal range of motion.    No joint effusions, or e Procedure                               Abnormality         Status                     ---------                               -----------         ------                     CBC W/ DIFFERENTIAL[[id number]]          Abnormal            Final result Lucency in the soft tissues along the lateral second metacarpal bone may represent a laceration versus confluence of soft tissues artifact. There is mild soft tissue swelling surrounding the wrist joint. Carpal bone alignment appears intact.   No radio-op

## (undated) NOTE — IP AVS SNAPSHOT
Patient Demographics     Address  [address] [place] [id number] Phone  [phone] [facility]  [phone] [facility] E-mail Address  [email or web address]      Emergency Contact(s)     Name Relation Home Work Mobile    Halle Addison Daughter 6 Commonly known as:  MIRALAX  Take 17 g by mouth daily. REFRESH LACRI-LUBE Oint     Sertraline HCl 100 MG Tabs  Commonly known as:  ZOLOFT  Take 1 tablet (100 mg total) by mouth once daily.      SYSTANE BALANCE 0.6 % Soln  Generic drug:  Propylene Glycol Kathi Pablo MD         furosemide 20 MG Tabs  Commonly known as:  LASIX  Next dose due:  6/5/18 in the morning      Take 1 tablet (20 mg total) by mouth daily.    Kathi Pablo MD         ketoconazole 2 % Crea  Commonly known as:  NIZORAL  Next d Next dose due:  6/4/18 in the evening      Take 2 tablets by mouth 3 (three) times daily. Maicol Longoria MD         Vitamin D 1000 units Tabs  Next dose due:  6/5/18 in the morning      Take 1 Tab by mouth daily.           Wheelchair Misc  Next dose du BILATERAL EYES     Order ID Medication Name Action Time Action Reason Comments    [id number] Polyvinyl Alcohol (LIQUI-TEARS) 1.4 % ophthalmic solution 1 drop 06/04/18 1052 Given            LEFT LOWER ABDOMEN     Order ID Medication Name Action Time Action H&P signed by Suzy Moya MD at 6/1/2018 12:23 PM  Version 2 of 2    Author:  Suzy Moya MD Service:  Hospitalist Author Type:  Physician    Filed:  6/1/2018 12:23 PM Date of Service:  6/1/2018  9:41 AM Status:  Addendum    :  Suzy Moya MD (Physician Prophy  -SCD  -heparin    Dispo  -pending clinical coarse  PCP:[GB.1] Pamela LUI JR, MD[GB.3]      Concerns regarding plan of care were discussed with patient. Patient agrees with plan as detailed above.  Discussed plan of care with [GB.1] Molly[G CARDIOVASCULAR: regular,nl S1 S2; no murmur, no gallop; no rub   ABDOMEN:  Soft, BS+; nondistended; non tender;no masses; EXTREMITIES:[GB.1] LE pitting[GB. 2] edema[GB. 1] with venous stasis[GB.2]; no cyanosis;, no calf tenderness; peripheral pulses intact Propylene Glycol (SYSTANE BALANCE) 0.6 % Ophthalmic Solution Apply to eye.  Disp:  Rfl: 0   Diclofenac Sodium (VOLTAREN) 1 % Transdermal Gel Apply 2 g to each knee three times daily as needed for pain Disp: 100 g Rfl: 3   furosemide 20 MG Oral Tab Take 1 ta CREATSERUM  1.36   GLU  126*   CA  8.3*       Recent Labs   Lab  05/31/18   2048   ALT  11*   AST  24   ALB  3.9       Recent Labs   Lab  05/31/18   2133  05/31/18   2332   TROP  0.00  0.01[GB.3]       Radiology:[GB.1] Xr Chest Ap Portable  (cpt=71045) -no further N/V,[GB. 2]  Mild abd, CT abd pelvis as noted[TN.1]  -diet as tolerated per speech eval[GB.2]    Leukocytosis   -[GB. 1]Tmax 101.4 overnight.[GB.2] WBC 17.4[GB.1]-->[GB.2]16. 3[TN.1].  PCT[GB.2] elevated, LA ok[TN.1]  -CXR no acute findings  -blood details[GB. 2]    N/V[GB. 1]- Resolved- ? Infectious  -? Viral/infectious, started after eating dinner[GB.2]  -hepatic fu[GB. 1]nction[GB.2]  Lipase normal[GB.1]   -no further N/V, no abdominal pain  -diet as tolerated per speech eval[GB.2]    Leukocytosis be hypoxemic at 88% and placed on O2. Daughter denies any fevers, chills,CP or SOB. She is his medical power of . His baseline is orientation to self only, he does know who his kids are. She tells me he was just moved to [facility].  He predomin History of hepatitis B- No residual disease   • OTHER DISEASES     Right inguinal hernia   • OTHER DISEASES     Essential tremor   • OTHER DISEASES     Cartilage injury to right knee- Pain manageable   • Venous insufficiency 9/25/2013[GB.3]        PSH[GB. Disp: 850 g Rfl: 6   acetaminophen (TYLENOL EXTRA STRENGTH) 500 MG Oral Tab Take 2 tablets by mouth 3 (three) times daily. Disp:  Rfl: 0   Mirabegron ER (MYRBETRIQ) 50 MG Oral Take 50 mg by mouth daily.  Disp:  Rfl:    Cholecalciferol (VITAMIN D) 1000 UNITS Physical Therapy Note signed by Erika Swenson PT at 6/3/2018  3:33 PM  Version 1 of 1    Author:  Erika Swenson PT Service:  Rehab Author Type:  Physical Therapist    Filed:  6/3/2018  3:33 PM Date of Service:  6/3/2018  3:23 PM Status:  Signed marching, ankle pumps. Pt and pt's son returned demo; also educ as to frequency of therex: a couple times per day for LAQ and marching, and hourly for ankle pumps.  Pt will continue to benefit from skilled inpt PT to facil awareness and control of physical 10-03: COLONOSCOPY      Comment: Negative  No date: OTHER SURGICAL HISTORY      Comment: Left shoulder repair  2007: OTHER SURGICAL HISTORY      Comment: Laser TURP with Dr. Rommel Lord  No date: [facility]  Type of Home: Assisted living -   Climbing 3-5 steps with a railing?: Total     AM-PAC Score:  Raw Score: 10   PT Approx Degree of Impairment Score: 76.75%   Standardized Score (AM-PAC Scale): 32.29   CMS Modifier (G-Code): CL    FUNCTIONAL ABILITY STATUS  Gait Assessment   Gait Assist Presenting Problem: nausea/vomiting    Problem List  Principal Problem:    Nausea and vomiting in adult  Active Problems:    Aspiration pneumonitis ([facility] [address]    Hypoxia      ASSESSMENT   RN contacted prior to care. Treatment coordianted w/ PT.  Pt received in AM-PAC Score:  Score: 12  Approx Degree of Impairment: 66.57%  Standardized Score (AM-PAC Scale): 30.6  CMS Modifier (G-Code): CL    FUNCTIONAL TRANSFER ASSESSMENT  Supine to Sit : Maximum assistance (x2)  Sit to Stand: Maximum assistance (x2)  Toilet Isidore Saliva Reason for Therapy: ADL/IADL Dysfunction and Discharge Planning    OCCUPATIONAL THERAPY ASSESSMENT   Patient is a 80year old male admitted 5/31/2018 for nausea and vomiting. Patient w/ Hx Alzheimer's/Dementia and residing at [facility]. acute IP goals and plan for discharge. RN aware. In this OT evaluation patient presents with the following impairments: decreased balance, decreased memory and problem solving, decreased activity tolerance.   These deficits manifest functionally while perf Past Surgical History  Past Surgical History:  No date: APPENDECTOMY  10-03: COLONOSCOPY      Comment: Negative  No date: OTHER SURGICAL HISTORY      Comment: Left shoulder repair  2007: OTHER SURGICAL HISTORY      Comment: Laser TURP with Dr. Ebony Hammans Gross Motor: WFL   Fine Motor: WFL     ADDITIONAL TESTS                                    NEUROLOGICAL FINDINGS  Neurological Findings: None                ACTIVITIES OF DAILY LIVING ASSESSMENT  AM-PAC ‘6-Clicks’ Inpatient Daily Activity Short Form  How m Khari Pimentel OTR/L  [facility][EW.1]        Attribution Key    EW. 1 - Oscar Eller OT on 6/1/2018  5:04 PM                     Video Swallow Study Notes     No notes of this type exist for this encounter.          SLP Note - SLP Not Reason for Referral: R/O aspiration    Problem List  Principal Problem:    Nausea and vomiting in adult  Active Problems:    Aspiration pneumonitis ([facility] [address]    Hypoxia      Past Medical History  Past Medical History:   Diagnosis Date   • Arrhythmia    • Atria Oral Phase of Swallow: Within Functional Limits  Bolus Retrieval: Intact  Bilabial Seal: Intact  Bolus Formation: Intact  Bolus Propulsion: Intact  Mastication: Intact  Retention: Intact    Pharyngeal Phase of Swallow:  Within Functional Limits  (Please not INFLUENZA 09/12/16     INFLUENZA 10/21/15     INFLUENZA 10/15/14     INFLUENZA 09/25/13     INFLUENZA 09/19/12     INFLUENZA 10/13/11     INFLUENZA 10/19/10     INFLUENZA 10/28/08     INFLUENZA 10/30/07     Pneumococcal (Prevnar 13) 10/21/15     Pneumovax

## (undated) NOTE — ED AVS SNAPSHOT
Desiree Gregory   MRN: [id number]    Department:  [facility] Emergency Department   Date of Visit:  11/1/2018           Disclosure     Insurance plans vary and the physician(s) referred by the ER may not be covered by your plan.  Please contact y within the next three months to obtain basic health screening including reassessment of your blood pressure.     IF THERE IS ANY CHANGE OR WORSENING OF YOUR CONDITION, CALL YOUR PRIMARY CARE PHYSICIAN AT ONCE OR RETURN IMMEDIATELY TO THE EMERGENCY DEPARTMEN